# Patient Record
Sex: FEMALE | Race: BLACK OR AFRICAN AMERICAN | NOT HISPANIC OR LATINO | Employment: OTHER | URBAN - METROPOLITAN AREA
[De-identification: names, ages, dates, MRNs, and addresses within clinical notes are randomized per-mention and may not be internally consistent; named-entity substitution may affect disease eponyms.]

---

## 2018-01-03 PROBLEM — E78.5 HYPERLIPEMIA: Status: ACTIVE | Noted: 2018-01-03

## 2018-01-03 PROBLEM — I48.0 PAROXYSMAL ATRIAL FIBRILLATION: Status: ACTIVE | Noted: 2018-01-03

## 2018-01-03 PROBLEM — F41.9 ANXIETY: Status: ACTIVE | Noted: 2018-01-03

## 2018-08-11 PROBLEM — Z00.00 MEDICARE ANNUAL WELLNESS VISIT, SUBSEQUENT: Status: ACTIVE | Noted: 2018-08-11

## 2018-08-11 PROBLEM — E04.1 THYROID NODULE: Status: ACTIVE | Noted: 2018-08-11

## 2018-08-11 PROBLEM — E55.9 VITAMIN D DEFICIENCY: Status: ACTIVE | Noted: 2018-08-11

## 2018-08-11 PROBLEM — Z12.11 COLON CANCER SCREENING: Status: ACTIVE | Noted: 2018-08-11

## 2018-08-11 PROBLEM — R73.02 IGT (IMPAIRED GLUCOSE TOLERANCE): Status: ACTIVE | Noted: 2018-08-11

## 2018-08-20 PROBLEM — R31.29 MICROSCOPIC HEMATURIA: Status: ACTIVE | Noted: 2018-08-20

## 2018-08-20 PROBLEM — E03.9 PRIMARY HYPOTHYROIDISM: Status: ACTIVE | Noted: 2018-08-20

## 2018-08-22 PROBLEM — E04.1 THYROID NODULE: Status: RESOLVED | Noted: 2018-08-11 | Resolved: 2018-08-22

## 2018-09-05 PROBLEM — M85.88 OSTEOPENIA OF LUMBAR SPINE: Status: ACTIVE | Noted: 2018-09-05

## 2018-10-15 PROBLEM — Z79.899 HIGH RISK MEDICATION USE: Status: ACTIVE | Noted: 2018-10-15

## 2018-10-15 PROBLEM — M25.532 LEFT WRIST PAIN: Status: ACTIVE | Noted: 2018-10-15

## 2018-10-15 PROBLEM — M25.542 PAIN IN THUMB JOINT WITH MOVEMENT, LEFT: Status: ACTIVE | Noted: 2018-10-15

## 2018-10-15 PROBLEM — R63.4 WEIGHT LOSS: Status: ACTIVE | Noted: 2018-10-15

## 2018-10-15 PROBLEM — R25.3 MUSCLE TWITCHING: Status: ACTIVE | Noted: 2018-10-15

## 2018-10-16 ENCOUNTER — TELEPHONE (OUTPATIENT)
Dept: PHYSICAL MEDICINE AND REHAB | Facility: CLINIC | Age: 83
End: 2018-10-16

## 2018-10-16 NOTE — TELEPHONE ENCOUNTER
----- Message from Teresa Gamble sent at 10/16/2018  2:03 PM CDT -----  Contact: dr cortés office  Requesting a call back to schedule nerve conduction test. Please call back at [phone]      Thanks,  Teresa Gamble

## 2018-11-01 ENCOUNTER — DOCUMENTATION ONLY (OUTPATIENT)
Dept: PHYSICAL MEDICINE AND REHAB | Facility: CLINIC | Age: 83
End: 2018-11-01

## 2018-11-01 NOTE — PROCEDURES
This elderly patient had a major mishap today with pre-certification not getting her EMG approved.  She arrived today at 1:00 pm for her 3:00 appointment.  The registration staff ([facility]) attempted to contact pre-cert without help.  My nurse contacted the patient's insurance and was able to finally have her cleared for EMG. Unfortunately Ms. Pimentel was very aggravated with the whole process and declined to be seen after all. She just left around 2:40 after I attempted to explain the problem to her.

## 2018-11-12 PROBLEM — Z00.00 MEDICARE ANNUAL WELLNESS VISIT, SUBSEQUENT: Status: RESOLVED | Noted: 2018-08-11 | Resolved: 2018-11-12

## 2019-04-18 ENCOUNTER — HOSPITAL ENCOUNTER (OUTPATIENT)
Dept: RADIOLOGY | Facility: HOSPITAL | Age: 84
Discharge: HOME | End: 2019-04-18
Attending: INTERNAL MEDICINE
Payer: MEDICARE

## 2019-04-18 DIAGNOSIS — M54.2 NECK PAIN: ICD-10-CM

## 2019-04-18 DIAGNOSIS — J01.00 ACUTE NON-RECURRENT MAXILLARY SINUSITIS: ICD-10-CM

## 2019-04-18 PROCEDURE — 70450 CT HEAD/BRAIN W/O DYE: CPT | Mod: TC

## 2019-04-18 PROCEDURE — 70450 CT HEAD WITHOUT CONTRAST: ICD-10-PCS | Mod: 26,,, | Performed by: RADIOLOGY

## 2019-04-18 PROCEDURE — 70486 CT SINUSES WITHOUT CONTRAST: ICD-10-PCS | Mod: 26,,, | Performed by: RADIOLOGY

## 2019-04-18 PROCEDURE — 70486 CT MAXILLOFACIAL W/O DYE: CPT | Mod: TC

## 2019-04-18 PROCEDURE — 70450 CT HEAD/BRAIN W/O DYE: CPT | Mod: 26,,, | Performed by: RADIOLOGY

## 2019-04-18 PROCEDURE — 70486 CT MAXILLOFACIAL W/O DYE: CPT | Mod: 26,,, | Performed by: RADIOLOGY

## 2019-05-01 PROBLEM — J01.00 ACUTE NON-RECURRENT MAXILLARY SINUSITIS: Status: ACTIVE | Noted: 2019-05-01

## 2019-05-01 PROBLEM — M54.2 NECK PAIN: Status: ACTIVE | Noted: 2019-05-01

## 2019-08-05 PROBLEM — J01.00 ACUTE NON-RECURRENT MAXILLARY SINUSITIS: Status: RESOLVED | Noted: 2019-05-01 | Resolved: 2019-08-05

## 2019-08-19 PROBLEM — M54.2 NECK PAIN: Status: RESOLVED | Noted: 2019-05-01 | Resolved: 2019-08-19

## 2019-08-19 PROBLEM — R25.3 MUSCLE TWITCHING: Status: RESOLVED | Noted: 2018-10-15 | Resolved: 2019-08-19

## 2019-08-19 PROBLEM — R63.4 WEIGHT LOSS: Status: RESOLVED | Noted: 2018-10-15 | Resolved: 2019-08-19

## 2019-08-19 PROBLEM — M25.532 LEFT WRIST PAIN: Status: RESOLVED | Noted: 2018-10-15 | Resolved: 2019-08-19

## 2019-08-19 PROBLEM — M25.542 PAIN IN THUMB JOINT WITH MOVEMENT, LEFT: Status: RESOLVED | Noted: 2018-10-15 | Resolved: 2019-08-19

## 2019-08-19 PROBLEM — Z12.11 COLON CANCER SCREENING: Status: RESOLVED | Noted: 2018-08-11 | Resolved: 2019-08-19

## 2019-08-19 PROBLEM — Z00.00 MEDICARE ANNUAL WELLNESS VISIT, SUBSEQUENT: Status: RESOLVED | Noted: 2018-08-11 | Resolved: 2019-08-19

## 2019-12-09 PROBLEM — I10 ESSENTIAL HYPERTENSION, BENIGN: Chronic | Status: ACTIVE | Noted: 2019-12-09

## 2020-03-09 PROBLEM — J01.00 ACUTE NON-RECURRENT MAXILLARY SINUSITIS: Status: RESOLVED | Noted: 2019-05-01 | Resolved: 2020-03-09

## 2023-02-20 PROBLEM — F41.1 GAD (GENERALIZED ANXIETY DISORDER): Status: ACTIVE | Noted: 2023-02-20

## 2023-03-15 PROBLEM — I77.9 DISORDER OF ARTERIES AND ARTERIOLES, UNSPECIFIED: Status: ACTIVE | Noted: 2023-03-15

## 2023-03-15 PROBLEM — D68.69 OTHER THROMBOPHILIA: Status: ACTIVE | Noted: 2023-03-15

## 2023-03-15 PROBLEM — F32.4 MAJOR DEPRESSIVE DISORDER, SINGLE EPISODE, IN PARTIAL REMISSION: Status: ACTIVE | Noted: 2023-03-15
